# Patient Record
Sex: MALE | Race: WHITE
[De-identification: names, ages, dates, MRNs, and addresses within clinical notes are randomized per-mention and may not be internally consistent; named-entity substitution may affect disease eponyms.]

---

## 2017-11-22 ENCOUNTER — HOSPITAL ENCOUNTER (EMERGENCY)
Dept: HOSPITAL 47 - EC | Age: 62
LOS: 1 days | Discharge: HOME | End: 2017-11-23
Payer: COMMERCIAL

## 2017-11-22 VITALS — HEART RATE: 56 BPM | SYSTOLIC BLOOD PRESSURE: 123 MMHG | DIASTOLIC BLOOD PRESSURE: 86 MMHG

## 2017-11-22 VITALS — RESPIRATION RATE: 18 BRPM

## 2017-11-22 DIAGNOSIS — Z88.0: ICD-10-CM

## 2017-11-22 DIAGNOSIS — I10: ICD-10-CM

## 2017-11-22 DIAGNOSIS — Z87.891: ICD-10-CM

## 2017-11-22 DIAGNOSIS — R07.2: Primary | ICD-10-CM

## 2017-11-22 DIAGNOSIS — Z79.1: ICD-10-CM

## 2017-11-22 DIAGNOSIS — Z79.899: ICD-10-CM

## 2017-11-22 DIAGNOSIS — Z79.82: ICD-10-CM

## 2017-11-22 LAB
ALP SERPL-CCNC: 66 U/L (ref 38–126)
ALT SERPL-CCNC: 54 U/L (ref 21–72)
ANION GAP SERPL CALC-SCNC: 12 MMOL/L
APTT BLD: 24.8 SEC (ref 22–30)
AST SERPL-CCNC: 32 U/L (ref 17–59)
BASOPHILS # BLD AUTO: 0.1 K/UL (ref 0–0.2)
BASOPHILS NFR BLD AUTO: 1 %
BUN SERPL-SCNC: 18 MG/DL (ref 9–20)
CALCIUM SPEC-MCNC: 9.5 MG/DL (ref 8.4–10.2)
CH: 30.9
CHCM: 33.3
CHLORIDE SERPL-SCNC: 106 MMOL/L (ref 98–107)
CK SERPL-CCNC: 146 U/L (ref 55–170)
CO2 SERPL-SCNC: 22 MMOL/L (ref 22–30)
EOSINOPHIL # BLD AUTO: 0.4 K/UL (ref 0–0.7)
EOSINOPHIL NFR BLD AUTO: 8 %
ERYTHROCYTE [DISTWIDTH] IN BLOOD BY AUTOMATED COUNT: 4.72 M/UL (ref 4.3–5.9)
ERYTHROCYTE [DISTWIDTH] IN BLOOD: 13.5 % (ref 11.5–15.5)
GLUCOSE SERPL-MCNC: 110 MG/DL (ref 74–99)
HCT VFR BLD AUTO: 44.1 % (ref 39–53)
HDW: 2.37
HGB BLD-MCNC: 14.6 GM/DL (ref 13–17.5)
INR PPP: 1.1 (ref ?–1.2)
LUC NFR BLD AUTO: 3 %
LYMPHOCYTES # SPEC AUTO: 1.9 K/UL (ref 1–4.8)
LYMPHOCYTES NFR SPEC AUTO: 34 %
MAGNESIUM SPEC-SCNC: 2 MG/DL (ref 1.6–2.3)
MCH RBC QN AUTO: 30.9 PG (ref 25–35)
MCHC RBC AUTO-ENTMCNC: 33.1 G/DL (ref 31–37)
MCV RBC AUTO: 93.3 FL (ref 80–100)
MONOCYTES # BLD AUTO: 0.4 K/UL (ref 0–1)
MONOCYTES NFR BLD AUTO: 8 %
NEUTROPHILS # BLD AUTO: 2.7 K/UL (ref 1.3–7.7)
NEUTROPHILS NFR BLD AUTO: 48 %
NON-AFRICAN AMERICAN GFR(MDRD): >60
POTASSIUM SERPL-SCNC: 4 MMOL/L (ref 3.5–5.1)
PROT SERPL-MCNC: 7.1 G/DL (ref 6.3–8.2)
PT BLD: 10.8 SEC (ref 9–12)
SODIUM SERPL-SCNC: 140 MMOL/L (ref 137–145)
TROPONIN I SERPL-MCNC: <0.012 NG/ML (ref 0–0.03)
WBC # BLD AUTO: 0.15 10*3/UL
WBC # BLD AUTO: 5.7 K/UL (ref 3.8–10.6)
WBC (PEROX): 5.99

## 2017-11-22 PROCEDURE — 99285 EMERGENCY DEPT VISIT HI MDM: CPT

## 2017-11-22 PROCEDURE — 36415 COLL VENOUS BLD VENIPUNCTURE: CPT

## 2017-11-22 PROCEDURE — 71010: CPT

## 2017-11-22 PROCEDURE — 84484 ASSAY OF TROPONIN QUANT: CPT

## 2017-11-22 PROCEDURE — 85610 PROTHROMBIN TIME: CPT

## 2017-11-22 PROCEDURE — 85730 THROMBOPLASTIN TIME PARTIAL: CPT

## 2017-11-22 PROCEDURE — 80053 COMPREHEN METABOLIC PANEL: CPT

## 2017-11-22 PROCEDURE — 83735 ASSAY OF MAGNESIUM: CPT

## 2017-11-22 PROCEDURE — 82553 CREATINE MB FRACTION: CPT

## 2017-11-22 PROCEDURE — 85025 COMPLETE CBC W/AUTO DIFF WBC: CPT

## 2017-11-22 PROCEDURE — 93005 ELECTROCARDIOGRAM TRACING: CPT

## 2017-11-22 PROCEDURE — 82550 ASSAY OF CK (CPK): CPT

## 2017-11-22 NOTE — XR
EXAMINATION TYPE: XR chest 1V portable

 

DATE OF EXAM: 11/22/2017

 

COMPARISON: 11/14/2011

 

HISTORY: Chest pain

 

TECHNIQUE: Single frontal view of the chest is obtained.

 

FINDINGS:  Heart and mediastinum are normal. Lungs are clear. Diaphragm is normal. There are chest le
ads. There is old right clavicle fracture.

 

IMPRESSION:  No active cardiopulmonary disease. No change.

## 2017-11-22 NOTE — ED
Chest Pain HPI





- General


Chief Complaint: Chest Pain


Stated Complaint: chest pain


Time Seen by Provider: 11/22/17 20:51


Source: patient


Mode of arrival: ambulatory


Limitations: no limitations





- History of Present Illness


Initial Comments: 





This patient is a 62-year-old man who presents to be evaluated for substernal 

chest pain that came on between 3-4 hours ago.  The patient states that he was 

at rest.  He describes as a feeling like someone is pressing down on his chest.

  It is constant.  He rates it mild.  The patient did not notice any worsening 

or relieving factors.  He does state that there is a bit of an upset stomach 

that came along with that he thought that it may be some indigestion.  Patient 

denies dyspnea, diaphoresis, palpitations, lightheadedness or syncope.  He did 

have a similar episode a few years ago, had a stress test, and was told that 

nothing was wrong with him.  Patient denies smoking,  stating that he quit 35 

years ago.  Patient's father did have a heart attack but he states that he was 

over 60 years old at the time.


MD Complaint: chest pain


Onset/Timing: 3


-: hour(s)


Onset: during rest


Pain Location: substernal


Pain Radiation: none


Severity: moderate


Quality: heaviness


Consistency: constant


Improves With: nothing


Worsens With: nothing


Anginal Symptoms: nausea


Treatments Prior to Arrival: none





- Related Data


 Home Medications











 Medication  Instructions  Recorded  Confirmed


 


Aspirin 81 mg PO HS 11/22/17 11/22/17


 


Calcium Carbonate/Vitamin D3 1 tab PO BID 11/22/17 11/22/17





[Calcium 600-Vit D3 400 Caplet]   


 


Lisinopril [Zestril] 40 mg PO BID 11/22/17 11/22/17


 


Multivitamins, Thera [Multivitamin 1 tab PO DAILY 11/22/17 11/22/17





(formulary)]   


 


Naproxen [Naprosyn] 500 mg PO BID 11/22/17 11/22/17


 


amLODIPine [Norvasc] 10 mg PO DAILY 11/22/17 11/22/17











 Allergies











Allergy/AdvReac Type Severity Reaction Status Date / Time


 


Penicillins Allergy  Unknown Verified 11/22/17 21:32





   Childhood  














Review of Systems


ROS Statement: 


Those systems with pertinent positive or pertinent negative responses have been 

documented in the HPI.





ROS Other: All systems not noted in ROS Statement are negative.


Constitutional: Denies: fever, chills


Respiratory: Denies: cough, dyspnea


Cardiovascular: Reports: chest pain.  Denies: palpitations, orthopnea, edema, 

syncope


Gastrointestinal: Reports: nausea.  Denies: abdominal pain, vomiting, diarrhea, 

melena, hematochezia


Genitourinary: Denies: dysuria, hematuria


Musculoskeletal: Denies: back pain


Skin: Denies: rash


Neurological: Denies: headache, weakness, numbness





EKG Findings





- EKG Results:


EKG: interpreted by ERMD, sinus rhythm, normal axis, normal QRS, normal ST/T





- Blocks, Axis, Hypertrophy, ST Abn:


AV and intraventricular conduction: 1 AV block





Past Medical History


Past Medical History: Hypertension


History of Any Multi-Drug Resistant Organisms: None Reported


Past Surgical History: Orthopedic Surgery


Additional Past Surgical History / Comment(s): right shoulder replacement and 

revision. left femur ORIF. left knee arthroscopy.


Past Psychological History: No Psychological Hx Reported


Smoking Status: Former smoker


Past Alcohol Use History: Occasional


Past Drug Use History: None Reported





General Exam


Limitations: no limitations


General appearance: alert, in no apparent distress


Head exam: Present: atraumatic, normocephalic


Eye exam: Present: normal appearance.  Absent: scleral icterus, conjunctival 

injection


ENT exam: Present: normal oropharynx


Neck exam: Present: normal inspection, full ROM


Respiratory exam: Present: normal lung sounds bilaterally.  Absent: respiratory 

distress, wheezes, rales, rhonchi, stridor


Cardiovascular Exam: Present: regular rate, normal rhythm, normal heart sounds.

  Absent: systolic murmur, diastolic murmur, rubs, gallop


GI/Abdominal exam: Present: soft.  Absent: distended, tenderness, guarding, 

rebound, mass, pulsatile mass


Extremities exam: Present: normal inspection, normal capillary refill.  Absent: 

pedal edema, calf tenderness


Back exam: Present: normal inspection.  Absent: CVA tenderness (R), CVA 

tenderness (L)


Neurological exam: Present: alert


Skin exam: Present: warm, dry, intact, normal color.  Absent: rash, cyanosis, 

diaphoretic, erythema, petechiae, pallor, mottled





Course


 Vital Signs











  11/22/17 11/22/17 11/22/17





  20:40 20:59 21:36


 


Temperature 99 F  


 


Pulse Rate 68 71 63


 


Respiratory 16 18 18





Rate   


 


Blood Pressure 158/93 140/87 137/91


 


O2 Sat by Pulse 99 99 98





Oximetry   














  11/22/17 11/22/17 11/22/17





  21:40 21:56 22:30


 


Temperature   


 


Pulse Rate 77 57 L 66


 


Respiratory 18 18 18





Rate   


 


Blood Pressure 124/78 116/80 135/75


 


O2 Sat by Pulse 98 98 97





Oximetry   














Disposition


Clinical Impression: 


 Chest pain





Disposition: HOME SELF-CARE


Condition: Good


Instructions:  Chest Pain (ED)


Referrals: 


Farzad Mallory MD [Primary Care Provider] - 1-2 days


Geovanny Martinez MD [STAFF PHYSICIAN] - 1-2 days

## 2017-11-23 VITALS — TEMPERATURE: 97.6 F

## 2018-09-18 ENCOUNTER — HOSPITAL ENCOUNTER (OUTPATIENT)
Dept: HOSPITAL 47 - ORWHC2ENDO | Age: 63
Discharge: HOME | End: 2018-09-18
Payer: COMMERCIAL

## 2018-09-18 VITALS — HEART RATE: 50 BPM | DIASTOLIC BLOOD PRESSURE: 70 MMHG | SYSTOLIC BLOOD PRESSURE: 113 MMHG

## 2018-09-18 VITALS — BODY MASS INDEX: 27.8 KG/M2

## 2018-09-18 VITALS — TEMPERATURE: 97.7 F | RESPIRATION RATE: 16 BRPM

## 2018-09-18 DIAGNOSIS — Z88.0: ICD-10-CM

## 2018-09-18 DIAGNOSIS — Z12.11: Primary | ICD-10-CM

## 2018-09-18 DIAGNOSIS — Z85.828: ICD-10-CM

## 2018-09-18 DIAGNOSIS — Z79.899: ICD-10-CM

## 2018-09-18 DIAGNOSIS — I10: ICD-10-CM

## 2018-09-18 DIAGNOSIS — K57.30: ICD-10-CM

## 2018-09-18 DIAGNOSIS — M19.90: ICD-10-CM

## 2018-09-18 PROCEDURE — 45378 DIAGNOSTIC COLONOSCOPY: CPT

## 2018-09-18 NOTE — P.PCN
Date of Procedure: 09/18/18


Procedure(s) Performed: 


Procedure: Total colonoscopy.





Preoperative diagnosis: Screening for neoplasia.





Postoperative diagnosis: Diverticulosis with no evidence of acute diverticulitis

, strictures, polyps or cancer.





Preparation: HalfLytely prep.





Sedation: Was provided by anesthesia.





Brief clinical history: The patient is a 63-year-old male who is scheduled for 

this evaluation for screening for neoplasia age being his risk factor.  He had 

a prior exam more than 10 years ago.  The patient has no abdominal complaints, 

bleeding or anemia.





Procedure: With the patient on his left lateral decubitus position and after 

informed consent and adequate sedation, the perianal area was inspected and it 

did not show any fissures or fistulas.  There were no masses felt on digital 

rectal examination.  The Olympus CFQ 160L video colonoscope was then inserted 

in the rectum in the usual fashion and advanced to the cecum.  There were 

multiple diverticular orifices seen scattered, mostly on the left side with 

occasional orifice around the hepatic flexure and on the right side, with no 

evidence of acute diverticulitis or strictures.  The mucosa appeared healthy.  

No polyps or tumors were seen.  I retroflexed the endoscope in the rectum 

before the endoscope was withdrawn.





The patient tolerated the procedure well.





Plan: The patient was reassured.  Discussed dietary measures.  He will follow 

up with you as planned and I recommended repeat exam in 10 years.

## 2018-11-09 ENCOUNTER — HOSPITAL ENCOUNTER (OUTPATIENT)
Dept: HOSPITAL 47 - RADUSMAIN | Age: 63
Discharge: HOME | End: 2018-11-09
Attending: FAMILY MEDICINE
Payer: COMMERCIAL

## 2018-11-09 DIAGNOSIS — I10: Primary | ICD-10-CM

## 2018-11-09 PROCEDURE — 93975 VASCULAR STUDY: CPT

## 2018-11-09 NOTE — US
EXAMINATION TYPE: US renal artery duplex complet

 

DATE OF EXAM: 11/9/2018

 

COMPARISON: NONE

 

CLINICAL HISTORY: I10 essential hypertention. uncontrolled HTN for five plus years.

 

MEASUREMENTS:

 

RENAL SIZE:

Rt Kidney: 11.2 x 6.1 x 6.3 cm

Lt Kidney: 12.2 x 6.2 x 5.5 cm

 

RESISTANCE INDEX

Right:  0.59

Left: 0.58

 

RA/AO RATIO (< 3.5 )

Right: 1.3

Left: 1.2

 

RA VELOCITY ( < 180 cm/s)

Right: 122

Left: 118

 

Aorta unremarkable. Cysts noted on both kidneys, largest right measures 1.5 x 1.5 x 1.3 cm, and large
st left measures 4.2 x 4.0 x 3.0 cm. No Ultrasound evidence for renal artery stenosis. Good upstroke 
on segmentals at renal hilum.

 

 

 

IMPRESSION:

No diagnostic evidence of renal artery stenosis.

## 2020-09-10 ENCOUNTER — HOSPITAL ENCOUNTER (OUTPATIENT)
Dept: HOSPITAL 47 - RADUSWWP | Age: 65
Discharge: HOME | End: 2020-09-10
Attending: FAMILY MEDICINE
Payer: MEDICARE

## 2020-09-10 DIAGNOSIS — Z13.6: Primary | ICD-10-CM

## 2020-09-10 PROCEDURE — 93979 VASCULAR STUDY: CPT

## 2020-09-10 NOTE — US
EXAMINATION TYPE: US duplex aorta

 

DATE OF EXAM: 9/10/2020

 

COMPARISON: US 11/9/18

 

CLINICAL HISTORY: Z13.6 Encounter for screening for cardiovascular...

 

EXAM MEASUREMENTS:

 

Abdominal Aorta:

** Proximal:  2.9 x 2.7 cm

** Mid:  3.1 x 2.2 cm

** Distal:  1.9 x 1.8 cm

** Bifurcation:  1.0 cm   1.2 cm

 

No evidence of AAA, suboptimal visualization overall due to large amounts of bowel gas. 

 

 

 

IMPRESSION:

No evidence of AAA

## 2024-08-08 ENCOUNTER — HOSPITAL ENCOUNTER (OUTPATIENT)
Dept: HOSPITAL 47 - LABWHC1 | Age: 69
Discharge: HOME | End: 2024-08-08
Attending: ORTHOPAEDIC SURGERY
Payer: MEDICARE

## 2024-08-08 ENCOUNTER — HOSPITAL ENCOUNTER (OUTPATIENT)
Dept: HOSPITAL 47 - LABPAT | Age: 69
Discharge: HOME | End: 2024-08-08
Attending: ORTHOPAEDIC SURGERY
Payer: MEDICARE

## 2024-08-08 DIAGNOSIS — Z22.322: ICD-10-CM

## 2024-08-08 DIAGNOSIS — Z01.818: Primary | ICD-10-CM

## 2024-08-08 DIAGNOSIS — I44.0: ICD-10-CM

## 2024-08-08 DIAGNOSIS — Z01.812: Primary | ICD-10-CM

## 2024-08-08 PROCEDURE — 85730 THROMBOPLASTIN TIME PARTIAL: CPT

## 2024-08-08 PROCEDURE — 86850 RBC ANTIBODY SCREEN: CPT

## 2024-08-08 PROCEDURE — 85610 PROTHROMBIN TIME: CPT

## 2024-08-08 PROCEDURE — 86901 BLOOD TYPING SEROLOGIC RH(D): CPT

## 2024-08-08 PROCEDURE — 86900 BLOOD TYPING SEROLOGIC ABO: CPT

## 2024-08-19 ENCOUNTER — HOSPITAL ENCOUNTER (INPATIENT)
Dept: HOSPITAL 47 - OR | Age: 69
LOS: 1 days | Discharge: HOME | DRG: 470 | End: 2024-08-20
Attending: ORTHOPAEDIC SURGERY | Admitting: ORTHOPAEDIC SURGERY
Payer: MEDICARE

## 2024-08-19 DIAGNOSIS — I10: ICD-10-CM

## 2024-08-19 DIAGNOSIS — M16.11: Primary | ICD-10-CM

## 2024-08-19 DIAGNOSIS — Z87.891: ICD-10-CM

## 2024-08-19 DIAGNOSIS — Z96.611: ICD-10-CM

## 2024-08-19 DIAGNOSIS — Z88.0: ICD-10-CM

## 2024-08-19 PROCEDURE — 0SR904A REPLACEMENT OF RIGHT HIP JOINT WITH CERAMIC ON POLYETHYLENE SYNTHETIC SUBSTITUTE, UNCEMENTED, OPEN APPROACH: ICD-10-PCS

## 2024-08-19 PROCEDURE — 73501 X-RAY EXAM HIP UNI 1 VIEW: CPT

## 2024-08-19 PROCEDURE — 64447 NJX AA&/STRD FEMORAL NRV IMG: CPT

## 2024-08-19 PROCEDURE — 3E0T3BZ INTRODUCTION OF ANESTHETIC AGENT INTO PERIPHERAL NERVES AND PLEXI, PERCUTANEOUS APPROACH: ICD-10-PCS

## 2024-09-16 NOTE — XR
Seng Shukla A 

ID: [id number] 

: 1955

 

 

EXAMINATION TYPE: XR Hip Limited 1 view RT

 

DATE OF EXAM: 2024

 

Comparison: None

 

Clinical History: 69-year-old male status post right hip arthroplasty, postoperative evaluation

 

Findings:

Image shows placement of right hip total arthroplasty. Acetabular cup and femoral stem components of 
the prosthesis are well seated without periprosthetic fracture. Alignment grossly anatomic. Soft tiss
ue air related to recent operation.

 

 

Impression:

Uncomplicated postoperative appearance right hip total arthroplasty.

## 2024-10-03 NOTE — FL
EXAMINATION TYPE: FL guidance operating room, XR Hip Limited RT

 

DATE OF EXAM: 9/10/2024 8:49 AM

 

COMPARISON: Pre Operative Images if available both CT/MRI or plain film 

 

CLINICAL INDICATION: Male, 69 years old with history of TOTAL ANT RIGHT HIP;

 

TECHNIQUE: FL guidance operating room, XR Hip Limited RT, multiple fluoroscopic images provided for p
rocedure.

 

Total fluoroscopy time: 37.6 seconds 

Total submitted images to PACS: 3 

DAP: 3.2157 mGym2 Gycm2 uGym2 cGycm2 or equivalent.

 

FINDINGS:

Fluoroscopic images during internal fixation/arthroplasty demonstrate fixation hardware in appropriat
e position. Hardware appears intact. No immediate complication identified.

 

 

IMPRESSION:

1.  No evidence for intraoperative complication.

2.  Please see the operative/procedural note for further details.

 

[facility], Workstation: [id number], 10/3/2024 10:33 AM

## 2024-10-30 ENCOUNTER — HOSPITAL ENCOUNTER (OUTPATIENT)
Dept: HOSPITAL 47 - RADCTMAIN | Age: 69
Discharge: HOME | End: 2024-10-30
Attending: UROLOGY
Payer: MEDICARE

## 2024-10-30 LAB — BUN SERPL-SCNC: 23 MG/DL (ref 9–20)

## 2024-10-30 PROCEDURE — 84520 ASSAY OF UREA NITROGEN: CPT

## 2024-10-30 PROCEDURE — 74400 UROGRAPHY IV +-KUB TOMOG: CPT

## 2024-10-30 PROCEDURE — 74178 CT ABD&PLV WO CNTR FLWD CNTR: CPT

## 2024-10-30 PROCEDURE — 36415 COLL VENOUS BLD VENIPUNCTURE: CPT

## 2024-10-30 PROCEDURE — 82565 ASSAY OF CREATININE: CPT

## 2024-10-30 NOTE — CT
EXAMINATION TYPE: CT urogram wo/w con

CT DLP: 4127.9 mGycm, Automated exposure control for dose reduction was used.

 

DATE OF EXAM: 10/30/2024 11:02 AM

 

COMPARISON: No direct comparisons.

 

CLINICAL INDICATION:Male, 69 years old with history of R31.0 GROSS HEMATURIA; PHH, blood in urine

 

TECHNIQUE:

 

Urogram of the abdomen and pelvis was performed before and after the administration of 100 cc of IV c
ontrast Isovue 300 contrast. Delayed imaging was performed. Coronal and sagittal reformats were perfo
rmed. One or more CT dose reduction strategies were utilized during this examination. 2D and 3D recon
structions are performed to assist visualization of the urinary tract on a separate workstation.

 

FINDINGS:

 

GENITOURINARY: 

 

RIGHT KIDNEY AND URETER: No calculi. No hydronephrosis or hydroureter. Exophytic posterior right mid 
kidney 2.0 cm nonenhancing cyst. Adjacent nonenhancing cortical 1.6 cm cyst. Renal sinus 3.1 cm nonen
hancing cyst. No suspicious enhancing mass identified. No urothelial lesions: no filling defect, dila
tion, stricture or wall thickening.

 

LEFT KIDNEY AND URETER: No calculi. No hydronephrosis or hydroureter. Exophytic left lower pole 1.4 c
m nonenhancing cyst. Left lower pole cortical 1.7 cm nonenhancing cyst. Additional exophytic left mid
 kidney 3.8 cm cyst with peripheral lateral 1.4 cm region of possible enhancement (series 19, image 4
9). No urothelial lesions: no filling defect, dilation, stricture or wall thickening.

 

URINARY BLADDER: Moderately well distended. Normal, no calculi, mass or other lesions.

REPRODUCTIVE: Enlarged prostate gland measuring 5.2 cm in transverse dimension. This indents upon the
 bladder base.

 

ABDOMEN

LIVER: Few scattered hepatic cysts the largest within the left hepatic lobe laterally measuring up to
 4.5 cm.. 

GALLBLADDER AND BILE DUCTS: Unremarkable

PANCREAS: Unremarkable. 

SPLEEN: Calcified 1.3 cm lesion within the inferior aspect of the spleen likely representing a trauma
tic pseudocyst.

ADRENAL GLANDS: Unremarkable.

STOMACH AND BOWEL: Distal colonic diverticulosis without evidence for acute diverticulitis. The appen
silke is within normal limits. No focal bowel wall thickening or surrounding inflammatory changes.. No 
evidence of bowel obstruction.

PERITONEUM:  No evidence of pneumoperitoneum or free fluid. Mildly enlarged 1.3 cm short axis lymph n
ode anterior to the IVC (series 5, image 51). Additional mildly enlarged periportal lymph nodes measu
ring up to 1.3 cm short axis.

VASCULATURE: No aortic aneurysm. 

MUSCULOSKELETAL: No acute osseous abnormalities. Postsurgical changes from right total hip arthroplas
ty. Additional post fixation changes of the left proximal femur. Moderate multilevel degenerative dis
c disease. Diffuse bone demineralization. Mild retrolisthesis of L3 on L4. Grade 1 anterolisthesis L5
 on S1 with bilateral pars defects. Degenerative changes of both SI joints.

SOFT TISSUE/ABDOMINAL WALL: Small fat filled umbilical hernia.

 

LOWER CHEST: Mild prominent heart size. The visualized lung bases are clear.

 

 

IMPRESSION:

1. Bilateral simple appearing renal cysts with a left mid kidney 3.8 cm cyst with possible peripheral
 enhancement. Raises possibility of renal cell carcinoma versus proteinaceous/hemorrhagic cyst. Furth
er evaluation with MR abdomen with IV contrast (renal mass protocol) is recommended.

2. No evidence of urolithiasis.

3. Few nonspecific mildly enlarged periportal and pericaval lymph nodes. Possibly related to #1.

4. Colonic diverticulosis without evidence for acute diverticulitis.

5. Prostatomegaly.

 

[facility], Workstation: [id number], 10/30/2024 11:53 AM